# Patient Record
Sex: MALE | Race: BLACK OR AFRICAN AMERICAN | Employment: PART TIME | URBAN - METROPOLITAN AREA
[De-identification: names, ages, dates, MRNs, and addresses within clinical notes are randomized per-mention and may not be internally consistent; named-entity substitution may affect disease eponyms.]

---

## 2024-01-17 ENCOUNTER — OFFICE VISIT (OUTPATIENT)
Dept: FAMILY MEDICINE CLINIC | Age: 19
End: 2024-01-17
Payer: COMMERCIAL

## 2024-01-17 VITALS
HEART RATE: 75 BPM | DIASTOLIC BLOOD PRESSURE: 72 MMHG | SYSTOLIC BLOOD PRESSURE: 118 MMHG | HEIGHT: 66 IN | TEMPERATURE: 98 F | RESPIRATION RATE: 16 BRPM | WEIGHT: 170.4 LBS | OXYGEN SATURATION: 99 % | BODY MASS INDEX: 27.38 KG/M2

## 2024-01-17 DIAGNOSIS — J45.50 SEVERE PERSISTENT ASTHMA WITHOUT COMPLICATION: ICD-10-CM

## 2024-01-17 DIAGNOSIS — L30.9 ECZEMA, UNSPECIFIED TYPE: ICD-10-CM

## 2024-01-17 DIAGNOSIS — J30.89 NON-SEASONAL ALLERGIC RHINITIS, UNSPECIFIED TRIGGER: ICD-10-CM

## 2024-01-17 DIAGNOSIS — Z76.89 ESTABLISHING CARE WITH NEW DOCTOR, ENCOUNTER FOR: Primary | ICD-10-CM

## 2024-01-17 DIAGNOSIS — Z87.438 HISTORY OF TORSION OF TESTIS: ICD-10-CM

## 2024-01-17 PROBLEM — J30.9 ALLERGIC RHINITIS: Status: ACTIVE | Noted: 2024-01-17

## 2024-01-17 PROBLEM — J45.909 ASTHMA: Status: ACTIVE | Noted: 2024-01-17

## 2024-01-17 PROCEDURE — 99204 OFFICE O/P NEW MOD 45 MIN: CPT | Performed by: INTERNAL MEDICINE

## 2024-01-17 RX ORDER — FLUTICASONE PROPIONATE 50 MCG
2 SPRAY, SUSPENSION (ML) NASAL DAILY
COMMUNITY
Start: 2024-01-04 | End: 2024-01-17 | Stop reason: SDUPTHER

## 2024-01-17 RX ORDER — CETIRIZINE HYDROCHLORIDE 10 MG/1
10 TABLET ORAL DAILY
COMMUNITY
Start: 2024-01-04 | End: 2024-01-17 | Stop reason: SDUPTHER

## 2024-01-17 RX ORDER — IPRATROPIUM BROMIDE AND ALBUTEROL 20; 100 UG/1; UG/1
1 SPRAY, METERED RESPIRATORY (INHALATION) EVERY 4 HOURS PRN
COMMUNITY
Start: 2024-01-05 | End: 2024-01-17 | Stop reason: SDUPTHER

## 2024-01-17 RX ORDER — DUPILUMAB 300 MG/2ML
300 INJECTION, SOLUTION SUBCUTANEOUS
Qty: 4 ML | Refills: 5 | Status: CANCELLED | OUTPATIENT
Start: 2024-01-17

## 2024-01-17 RX ORDER — FLUTICASONE PROPIONATE 50 MCG
2 SPRAY, SUSPENSION (ML) NASAL DAILY
Qty: 16 G | Refills: 0 | Status: SHIPPED | OUTPATIENT
Start: 2024-01-17

## 2024-01-17 RX ORDER — DUPILUMAB 300 MG/2ML
300 INJECTION, SOLUTION SUBCUTANEOUS
COMMUNITY

## 2024-01-17 RX ORDER — FLUTICASONE PROPIONATE AND SALMETEROL XINAFOATE 230; 21 UG/1; UG/1
2 AEROSOL, METERED RESPIRATORY (INHALATION) 2 TIMES DAILY
COMMUNITY
End: 2024-01-17 | Stop reason: SDUPTHER

## 2024-01-17 RX ORDER — FLUTICASONE PROPIONATE AND SALMETEROL XINAFOATE 230; 21 UG/1; UG/1
2 AEROSOL, METERED RESPIRATORY (INHALATION) 2 TIMES DAILY
Qty: 12 G | Refills: 5 | Status: SHIPPED | OUTPATIENT
Start: 2024-01-17

## 2024-01-17 RX ORDER — IPRATROPIUM BROMIDE AND ALBUTEROL 20; 100 UG/1; UG/1
1 SPRAY, METERED RESPIRATORY (INHALATION) EVERY 4 HOURS PRN
Qty: 4 G | Refills: 5 | Status: SHIPPED | OUTPATIENT
Start: 2024-01-17

## 2024-01-17 RX ORDER — CETIRIZINE HYDROCHLORIDE 10 MG/1
10 TABLET ORAL DAILY
Qty: 90 TABLET | Refills: 3 | Status: SHIPPED | OUTPATIENT
Start: 2024-01-17

## 2024-01-17 SDOH — ECONOMIC STABILITY: INCOME INSECURITY: HOW HARD IS IT FOR YOU TO PAY FOR THE VERY BASICS LIKE FOOD, HOUSING, MEDICAL CARE, AND HEATING?: NOT HARD AT ALL

## 2024-01-17 SDOH — ECONOMIC STABILITY: FOOD INSECURITY: WITHIN THE PAST 12 MONTHS, THE FOOD YOU BOUGHT JUST DIDN'T LAST AND YOU DIDN'T HAVE MONEY TO GET MORE.: NEVER TRUE

## 2024-01-17 SDOH — ECONOMIC STABILITY: FOOD INSECURITY: WITHIN THE PAST 12 MONTHS, YOU WORRIED THAT YOUR FOOD WOULD RUN OUT BEFORE YOU GOT MONEY TO BUY MORE.: NEVER TRUE

## 2024-01-17 SDOH — ECONOMIC STABILITY: HOUSING INSECURITY
IN THE LAST 12 MONTHS, WAS THERE A TIME WHEN YOU DID NOT HAVE A STEADY PLACE TO SLEEP OR SLEPT IN A SHELTER (INCLUDING NOW)?: NO

## 2024-01-17 ASSESSMENT — PATIENT HEALTH QUESTIONNAIRE - PHQ9
SUM OF ALL RESPONSES TO PHQ9 QUESTIONS 1 & 2: 0
1. LITTLE INTEREST OR PLEASURE IN DOING THINGS: 0
SUM OF ALL RESPONSES TO PHQ QUESTIONS 1-9: 0
2. FEELING DOWN, DEPRESSED OR HOPELESS: 0
SUM OF ALL RESPONSES TO PHQ QUESTIONS 1-9: 0

## 2024-01-17 NOTE — PROGRESS NOTES
[facility] PRIMARY CARE  [address] [facility] [facility] [address]  Dept: [phone]  Dept Fax: [phone]     NAME: Maria Victoria Wray        :  2005        MRN:  [id number]    Chief Complaint   Patient presents with    New Patient     Needing to Establish just moved from [place], was at [facility] in 10/2023 for testicular torsion, has not seen Urologist. Surgery done in [place] at [facility].     Asthma    Eczema       History of Present Illness  Maria Victoria Wray is a 18 y.o. male who presents today to establish care.     Had testicular torsion at age 14 initially, and had emergency surgery and both side were tacked to prevent reoccurrence     Had pain again recently and was seen at [facility] but pain resolved spontaneously     Patient was diagnosed with severe asthma while living in [place].  He is well-controlled on Advair and Combivent as needed.  He is also on Dupixent which was initially prescribed for his asthma although only covered by insurance for the diagnosis of eczema.  He does suffer from eczema as well and flareups are debilitating.  He was following with both pulmonology and dermatology in [place].  Dupixent was coming from his dermatologist.    Review of Systems  Please see HPI above. Comprehensive review of systems negative unless otherwise noted above.    Medical History   No past medical history on file.    Surgical History   No past surgical history on file.    Family History  No family history on file.    Social History  Social History     Tobacco Use    Smoking status: Never     Passive exposure: Past    Smokeless tobacco: Never   Substance Use Topics    Alcohol use: Never       Home Medications  Current Outpatient Medications   Medication Sig Dispense Refill    DUPIXENT 300 MG/2ML SOPN injection Inject 2 mLs into the skin every 14 days      ADVAIR -21 MCG/ACT inhaler Inhale 2 puffs into the lungs 2 times daily 12 g 5    cetirizine

## 2024-03-06 ENCOUNTER — APPOINTMENT (OUTPATIENT)
Dept: GENERAL RADIOLOGY | Age: 19
End: 2024-03-06
Payer: COMMERCIAL

## 2024-03-06 ENCOUNTER — HOSPITAL ENCOUNTER (EMERGENCY)
Age: 19
Discharge: HOME | End: 2024-03-06
Payer: COMMERCIAL

## 2024-03-06 VITALS
OXYGEN SATURATION: 100 % | SYSTOLIC BLOOD PRESSURE: 108 MMHG | TEMPERATURE: 98 F | HEART RATE: 88 BPM | RESPIRATION RATE: 18 BRPM | DIASTOLIC BLOOD PRESSURE: 60 MMHG

## 2024-03-06 DIAGNOSIS — S60.221A CONTUSION OF RIGHT HAND, INITIAL ENCOUNTER: Primary | ICD-10-CM

## 2024-03-06 PROCEDURE — 99283 EMERGENCY DEPT VISIT LOW MDM: CPT

## 2024-03-06 PROCEDURE — 73130 X-RAY EXAM OF HAND: CPT

## 2024-03-06 ASSESSMENT — PAIN - FUNCTIONAL ASSESSMENT: PAIN_FUNCTIONAL_ASSESSMENT: 0-10

## 2024-03-06 ASSESSMENT — PAIN SCALES - GENERAL: PAINLEVEL_OUTOF10: 4

## 2024-03-06 NOTE — DISCHARGE INSTR - COC
Continuity of Care Form    Patient Name: Maria Victoria Wray   :  2005  MRN:  [id number]    Admit date:  3/6/2024  Discharge date:  ***    Code Status Order: No Order   Advance Directives:     Admitting Physician:  No admitting provider for patient encounter.  PCP: Divya Olivier DO    Discharging Nurse: ***  Discharging Hospital Unit/Room#:   Discharging Unit Phone Number: ***    Emergency Contact:   Extended Emergency Contact Information  Primary Emergency Contact: Jelly Hebert, [facility]  Home Phone: [phone]  Mobile Phone: [phone]  Relation: Parent    Past Surgical History:  History reviewed. No pertinent surgical history.    Immunization History:     There is no immunization history on file for this patient.    Active Problems:  Patient Active Problem List   Diagnosis Code    Allergic rhinitis J30.9    Asthma J45.909    Eczema L30.9       Isolation/Infection:   Isolation            No Isolation          Patient Infection Status       None to display            Nurse Assessment:  Last Vital Signs: /60   Pulse 88   Temp 98 °F (36.7 °C)   Resp 18   SpO2 100%     Last documented pain score (0-10 scale): Pain Level: 4  Last Weight:   Wt Readings from Last 1 Encounters:   24 77.3 kg (170 lb 6.4 oz) (74 %, Z= 0.64)*     * Growth percentiles are based on [facility] (Boys, 2-20 Years) data.     Mental Status:  {IP PT MENTAL STATUS:83021}    IV Access:  { ROYAL IV ACCESS:108182264}    Nursing Mobility/ADLs:  Walking   {CHP DME ADLs:118785000}  Transfer  {CHP DME ADLs:741743919}  Bathing  {CHP DME ADLs:794687058}  Dressing  {CHP DME ADLs:782068631}  Toileting  {CHP DME ADLs:809489557}  Feeding  {CHP DME ADLs:829596689}  Med Admin  {CHP DME ADLs:285427113}  Med Delivery   { ROYAL MED Delivery:309101650}    Wound Care Documentation and Therapy:        Elimination:  Continence:   Bowel: {YES / NO:}  Bladder: {YES / NO:}  Urinary Catheter:

## 2024-03-06 NOTE — ED PROVIDER NOTES
---------------------------   The nursing notes within the ED encounter and vital signs as below have been reviewed.   /60   Pulse 88   Temp 98 °F (36.7 °C)   Resp 18   SpO2 100%   Oxygen Saturation Interpretation: Normal      ---------------------------------------------------PHYSICAL EXAM--------------------------------------    Constitutional/General: Alert and oriented x3, well appearing, non toxic in NAD  Head: NC/AT  Eyes: PERRL, EOMI  Mouth: Oropharynx clear, handling secretions, no trismus  Neck: Supple, full ROM, no meningeal signs  Pulmonary: Lungs clear to auscultation bilaterally, no wheezes, rales, or rhonchi. Not in respiratory distress  Cardiovascular:  Regular rate and rhythm, no murmurs, gallops, or rubs. 2+ distal pulses  Abdomen: Soft, non tender, non distended,   Extremities: Moves all extremities x 4. Warm and well perfused.  Full range of motion of all the digits bilaterally.  Patient able to make a fist.  Patient able to touch thumb to pinky.  Sensations intact.  2+ radial pulses.  Good capillary refill  Skin: warm and dry without rash. + Ecchymosis noted over the MCP joints of the right hand that is in the healing phases.  Neurologic: GCS 15,  Psych: Normal Affect      ------------------------------ ED COURSE/MEDICAL DECISION MAKING----------------------  Medications - No data to display      Medical Decision Making:    Maria Victoria Wray is a 18 y.o. male with past medical history of asthma, eczema and allergy presenting to the ED by private vehicle for right hand pain.  He frequently practices boxing and has a punching bag at home.  A week ago he was boxing with his gloves on when he noticed his right hand hurt more than usual.  He did notice some bruising over the ring finger and the pinky finger at the knuckles.  The pain has not subsided over the past week.  It continues to be an annoyance to him.  He has not tried any medications at home for the pain.  Full motor and sensory

## 2024-03-12 ENCOUNTER — TELEPHONE (OUTPATIENT)
Dept: PULMONOLOGY | Age: 19
End: 2024-03-12

## 2024-03-13 ENCOUNTER — OFFICE VISIT (OUTPATIENT)
Dept: PULMONOLOGY | Age: 19
End: 2024-03-13
Payer: COMMERCIAL

## 2024-03-13 VITALS
SYSTOLIC BLOOD PRESSURE: 128 MMHG | WEIGHT: 183 LBS | HEART RATE: 52 BPM | OXYGEN SATURATION: 98 % | DIASTOLIC BLOOD PRESSURE: 60 MMHG | TEMPERATURE: 97.2 F | RESPIRATION RATE: 17 BRPM | BODY MASS INDEX: 29.54 KG/M2

## 2024-03-13 DIAGNOSIS — R06.2 WHEEZING: ICD-10-CM

## 2024-03-13 DIAGNOSIS — J45.20 MILD INTERMITTENT ASTHMA WITHOUT COMPLICATION: Primary | ICD-10-CM

## 2024-03-13 LAB — FENO: 11 PPB

## 2024-03-13 PROCEDURE — 99203 OFFICE O/P NEW LOW 30 MIN: CPT | Performed by: NURSE PRACTITIONER

## 2024-03-13 PROCEDURE — 99205 OFFICE O/P NEW HI 60 MIN: CPT | Performed by: NURSE PRACTITIONER

## 2024-03-13 PROCEDURE — 95012 NITRIC OXIDE EXP GAS DETER: CPT | Performed by: NURSE PRACTITIONER

## 2024-03-13 RX ORDER — ALBUTEROL SULFATE 90 UG/1
2 AEROSOL, METERED RESPIRATORY (INHALATION) EVERY 4 HOURS PRN
Qty: 18 G | Refills: 2 | Status: SHIPPED | OUTPATIENT
Start: 2024-03-13 | End: 2024-04-16

## 2024-03-13 ASSESSMENT — PULMONARY FUNCTION TESTS: FENO: 11

## 2024-03-13 NOTE — PROGRESS NOTES
New pt to establish care with pulmonary provider since moving to [place]. Plan for pt to continue meds as ordered. Discontinue Combivent and use new Albuterol rescue inhaler prn. Plan for lab work and follow up in office in  6 mos. Appt card given.   
17, Temp: 97.2 °F (36.2 °C),  , SpO2: 98 %,  , Weight - Scale: 83 kg (183 lb)    BMI body mass index is 29.54 kg/m².  Ideal Body Weight: Patient height not recorded  ---------------  Physical Exam:    General: Alert and oriented x 3. No acute distress.  Eyes:  Vision - grossly normal, PERRLA  HEENT:  Head is atraumatic and normocephalic.  Neck is supple, no jugular venous distention.  Respiratory:  Lungs are clear to auscultation, respirations are nonlabored, breath sounds are equal.  Cardiovascular:  S1, S2 normal, regular rate and rhythm, no murmur, no pedal edema  Gastrointestinal:  Soft, nontender, nondistended.  Normal bowel sounds.  No organomegaly  Neurologic:  Awake and alert, cranial nerves 2-12 grossly intact, no focal motor or sensory deficits.  Musculoskeletal: Steady ambulation without need for assistance    Labs     CBC with Differential:  No results found for: \"WBC\", \"RBC\", \"HGB\", \"HCT\", \"PLT\", \"MCV\", \"MCH\", \"MCHC\", \"RDW\", \"NRBC\", \"SEGSPCT\", \"BANDSPCT\", \"BLASTSPCT\", \"METASPCT\", \"LYMPHOPCT\", \"PROMYELOPCT\", \"MONOPCT\", \"MYELOPCT\", \"EOSPCT\", \"BASOPCT\", \"MONOSABS\", \"LYMPHSABS\", \"EOSABS\", \"BASOSABS\", \"DIFFTYPE\"  CMP:  No results found for: \"NA\", \"K\", \"CL\", \"CO2\", \"BUN\", \"CREATININE\", \"GFRAA\", \"AGRATIO\", \"LABGLOM\", \"GLUCOSE\", \"GLU\", \"PROT\", \"LABALBU\", \"CALCIUM\", \"BILITOT\", \"ALKPHOS\", \"AST\", \"ALT\"  Magnesium:  No results found for: \"MG\"  Phosphorus:  No results found for: \"PHOS\"  ABG:  No results found for: \"PH\", \"PCO2\", \"PO2\", \"HCO3\", \"BE\", \"THGB\", \"TCO2\", \"O2SAT\"       I hope this updates you on my evaluation and clinical thinking. Thank you for allowing me to participate in the care of Maria Victoria Wray.      Sincerely,    Electronically signed by HUNTER Romo CNP on 3/13/2024 at 11:59 AM

## 2024-03-19 DIAGNOSIS — R06.2 WHEEZING: ICD-10-CM

## 2024-03-19 LAB — EOSINOPHILS ABSOLUTE: 140 /UL (ref 50–250)

## 2024-03-22 LAB
ALLERGEN BERMUDA GRASS IGE: 9.47 KU/L (ref 0–0.34)
ALLERGEN BIRCH IGE: 2.21 KU/L (ref 0–0.34)
ALLERGEN DOG DANDER IGE: 1.47 KU/L (ref 0–0.34)
ALLERGEN GERMAN COCKROACH IGE: 0.15 KU/L (ref 0–0.34)
ALLERGEN HORMODENDRUM IGE: 4.41 KUL/L (ref 0–0.34)
ALLERGEN MOUSE EPITHELIA IGE: 0.12 KU/L (ref 0–0.34)
ALLERGEN OAK TREE IGE: 2.13 KU/L (ref 0–0.34)
ALLERGEN PECAN TREE IGE: 2.76 KU/L (ref 0–0.34)
ALLERGEN PIGWEED ROUGH IGE: ABNORMAL KU/L (ref 0–0.34)
ALLERGEN SHEEP SORREL (W18) IGE: ABNORMAL KU/L (ref 0–0.34)
ALLERGEN TREE SYCAMORE: 2.73 KU/L (ref 0–0.34)
ALLERGEN WALNUT TREE IGE: 3.07 KU/L (ref 0–0.34)
ALLERGEN WHITE MULBERRY TREE, IGE: 1.1 KU/L (ref 0–0.34)
ALLERGEN, TREE, WHITE ASH IGE: 4.95 KU/L (ref 0–0.34)
ALTERNARIA ALTERNATA: 6.06 KU/L (ref 0–0.34)
ASPERGILLUS FUMIGATUS: 4.72 KU/L (ref 0–0.34)
CAT DANDER ANTIBODY: 2.03 KU/L (ref 0–0.34)
COTTONWOOD TREE: 4.22 KU/L (ref 0–0.34)
D. FARINAE: 0.46 KU/L (ref 0–0.34)
D. PTERONYSSINUS: 0.41 KU/L (ref 0–0.34)
ELM TREE: 2.78 KU/L (ref 0–0.34)
IGE: 173 IU/ML (ref 0–100)
MAPLE/BOXELDER TREE: 2.39 KU/L (ref 0–0.34)
MOUNTAIN CEDAR TREE: 0.86 KU/L (ref 0–0.34)
MUCOR RACEMOSUS: 0.56 KU/L (ref 0–0.34)
P. NOTATUM: 1.49 KU/L (ref 0–0.34)
RUSSIAN THISTLE: ABNORMAL KU/L (ref 0–0.34)
SHORT RAGWD(A ARTEMIS.) IGE: ABNORMAL KU/L (ref 0–0.34)
TIMOTHY GRASS: 22.2 KU/L (ref 0–0.34)

## 2024-03-26 ENCOUNTER — OFFICE VISIT (OUTPATIENT)
Dept: FAMILY MEDICINE CLINIC | Age: 19
End: 2024-03-26
Payer: COMMERCIAL

## 2024-03-26 ENCOUNTER — TELEPHONE (OUTPATIENT)
Dept: FAMILY MEDICINE CLINIC | Age: 19
End: 2024-03-26

## 2024-03-26 VITALS
HEART RATE: 55 BPM | BODY MASS INDEX: 28.93 KG/M2 | OXYGEN SATURATION: 99 % | SYSTOLIC BLOOD PRESSURE: 139 MMHG | DIASTOLIC BLOOD PRESSURE: 82 MMHG | WEIGHT: 180 LBS | TEMPERATURE: 97.5 F | HEIGHT: 66 IN | RESPIRATION RATE: 16 BRPM

## 2024-03-26 DIAGNOSIS — M25.561 ACUTE PAIN OF RIGHT KNEE: Primary | ICD-10-CM

## 2024-03-26 PROCEDURE — 99213 OFFICE O/P EST LOW 20 MIN: CPT

## 2024-03-26 RX ORDER — ACETAMINOPHEN 500 MG
500 TABLET ORAL 4 TIMES DAILY PRN
Qty: 30 TABLET | Refills: 1 | Status: CANCELLED | OUTPATIENT
Start: 2024-03-26

## 2024-03-26 NOTE — TELEPHONE ENCOUNTER
----- Message from HUNTER Roca CNP sent at 3/26/2024  4:17 PM EDT -----  Results of XR right knee: No acute osseous abnormality. Suspect mild joint effusion. (Fluid on the knee due to injury). Referral made to [facility] Navigator she will contact patient and get him set up with an ortho doctor who will take testing further. Continue with rest, ice, compression and elevation of the right knee.

## 2024-03-26 NOTE — PROGRESS NOTES
symptoms worsen or change. ED immediately with any calf pain/swelling, severe/worsening knee pain, paresthesias, weakness, fever, CP, or SOB. Pt states understanding and is in agreement with this care plan. All questions answered.      HUNTER Roca - CNP    **This report was transcribed using voice recognition software. Every effort was made to ensure accuracy; however, inadvertent computerized transcription errors may be present.

## 2024-03-26 NOTE — TELEPHONE ENCOUNTER
Results relayed to patient by phone. All questions/concerns addressed and answered. Patient voiced understanding and will call with any additional questions.

## 2024-03-28 LAB
ALLERGEN PIGWEED ROUGH IGE: 3.25 KU/L (ref 0–0.34)
ALLERGEN SHEEP SORREL (W18) IGE: 2.84 KU/L (ref 0–0.34)
RUSSIAN THISTLE: 2.22 KU/L (ref 0–0.34)
SHORT RAGWD(A ARTEMIS.) IGE: 4.21 KU/L (ref 0–0.34)

## 2024-04-02 SDOH — HEALTH STABILITY: PHYSICAL HEALTH: ON AVERAGE, HOW MANY MINUTES DO YOU ENGAGE IN EXERCISE AT THIS LEVEL?: 120 MIN

## 2024-04-02 SDOH — HEALTH STABILITY: PHYSICAL HEALTH: ON AVERAGE, HOW MANY DAYS PER WEEK DO YOU ENGAGE IN MODERATE TO STRENUOUS EXERCISE (LIKE A BRISK WALK)?: 7 DAYS

## 2024-04-04 ENCOUNTER — OFFICE VISIT (OUTPATIENT)
Dept: ORTHOPEDIC SURGERY | Age: 19
End: 2024-04-04
Payer: COMMERCIAL

## 2024-04-04 VITALS — HEIGHT: 66 IN | TEMPERATURE: 98 F | WEIGHT: 180 LBS | BODY MASS INDEX: 28.93 KG/M2

## 2024-04-04 DIAGNOSIS — M25.461 EFFUSION OF RIGHT KNEE: ICD-10-CM

## 2024-04-04 DIAGNOSIS — S83.281A ACUTE LATERAL MENISCUS TEAR OF RIGHT KNEE, INITIAL ENCOUNTER: Primary | ICD-10-CM

## 2024-04-04 DIAGNOSIS — M25.561 ACUTE PAIN OF RIGHT KNEE: ICD-10-CM

## 2024-04-04 PROCEDURE — 99204 OFFICE O/P NEW MOD 45 MIN: CPT | Performed by: ORTHOPAEDIC SURGERY

## 2024-04-04 RX ORDER — MELOXICAM 7.5 MG/1
7.5 TABLET ORAL DAILY
Qty: 30 TABLET | Refills: 3 | Status: SHIPPED | OUTPATIENT
Start: 2024-04-04

## 2024-04-04 ASSESSMENT — ENCOUNTER SYMPTOMS
EYE DISCHARGE: 0
ALLERGIC/IMMUNOLOGIC NEGATIVE: 1
SHORTNESS OF BREATH: 0
ABDOMINAL DISTENTION: 0

## 2024-04-04 NOTE — PROGRESS NOTES
Maria Victoria Wray (:  2005) is a 18 y.o. male,New patient, here for evaluation of the following chief complaint(s):  Knee Pain (Right knee pain - pt was lifting weight x2 weeks ago - explosive exorcises, started noticing pain and tightness while walking down stairs, pain increased at night/after shower )         ASSESSMENT/PLAN:  1. Acute lateral meniscus tear of right knee, initial encounter  -     MRI KNEE RIGHT WO CONTRAST; Future  2. Acute pain of right knee  -     MRI KNEE RIGHT WO CONTRAST; Future  3. Effusion of right knee  -     MRI KNEE RIGHT WO CONTRAST; Future    This is a 18 y.o. year old male with Acute lateral meniscus tear of right knee, initial encounter [S83.698A].  I discussed a variety of treatment options with the patient today including observation, NSAID, low impact exercise, physical therapy. I also discussed MRI in the setting of +McMurrays and acute pain. The patient would like to proceed with NSAIDs and MRI.    We discussed the use of NSAIDs for treatment of effusion including the risk and benefits, possible side affects and need for monitoring of blood work.   Mobic 7.5 BID      Return for MRI review when completed.         Subjective   SUBJECTIVE/OBJECTIVE:  Knee Pain     18-year-old male presents to the office today to discuss his right knee.  He was previously seen at the walk-in clinic for evaluation of the same knee.  He states he was squatting when he noted some issues with his knee.  He felt as though he strained the knee.  He then wrestled the following day and had a twisting injury.  He felt a pop mainly on the lateral side of the knee.  He had difficulty bearing weight.  He noted immediate effusion.  He is wearing a brace on the knee.  He rates his pain 5 out of 10 and is here today to discuss further treatment options.  He states he has not been able to do essentially any activity over the last week secondary to significant pain and difficulty bearing

## 2024-04-19 DIAGNOSIS — J45.50 SEVERE PERSISTENT ASTHMA WITHOUT COMPLICATION: ICD-10-CM

## 2024-04-19 RX ORDER — IPRATROPIUM BROMIDE AND ALBUTEROL 20; 100 UG/1; UG/1
1 SPRAY, METERED RESPIRATORY (INHALATION) EVERY 6 HOURS PRN
Qty: 2 EACH | Refills: 5 | Status: CANCELLED | OUTPATIENT
Start: 2024-04-19

## 2024-04-19 RX ORDER — IPRATROPIUM BROMIDE AND ALBUTEROL 20; 100 UG/1; UG/1
1 SPRAY, METERED RESPIRATORY (INHALATION) EVERY 6 HOURS PRN
Qty: 4 G | Refills: 5 | Status: SHIPPED | OUTPATIENT
Start: 2024-04-19

## 2024-04-19 RX ORDER — IPRATROPIUM BROMIDE AND ALBUTEROL 20; 100 UG/1; UG/1
1 SPRAY, METERED RESPIRATORY (INHALATION) EVERY 4 HOURS PRN
Qty: 4 G | Refills: 5 | Status: SHIPPED
Start: 2024-04-19 | End: 2024-04-19 | Stop reason: SDUPTHER

## 2024-04-19 NOTE — TELEPHONE ENCOUNTER
Last Appointment:  1/17/2024  Future Appointments   Date Time Provider Department Center   4/24/2024 11:30 AM [facility] MRI [facility]   7/17/2024  9:15 AM Divya Olivier DO [facility]   9/17/2024 11:00 AM Rachael Monreal, APRN - CNP [facility]

## 2024-05-02 ENCOUNTER — OFFICE VISIT (OUTPATIENT)
Dept: ORTHOPEDIC SURGERY | Age: 19
End: 2024-05-02
Payer: COMMERCIAL

## 2024-05-02 VITALS — WEIGHT: 180 LBS | HEIGHT: 66 IN | BODY MASS INDEX: 28.93 KG/M2

## 2024-05-02 DIAGNOSIS — M25.561 ACUTE PAIN OF RIGHT KNEE: ICD-10-CM

## 2024-05-02 DIAGNOSIS — S83.281A ACUTE LATERAL MENISCUS TEAR OF RIGHT KNEE, INITIAL ENCOUNTER: Primary | ICD-10-CM

## 2024-05-02 DIAGNOSIS — M25.461 EFFUSION OF RIGHT KNEE: ICD-10-CM

## 2024-05-02 PROCEDURE — 99214 OFFICE O/P EST MOD 30 MIN: CPT | Performed by: ORTHOPAEDIC SURGERY

## 2024-05-02 ASSESSMENT — ENCOUNTER SYMPTOMS
ABDOMINAL DISTENTION: 0
SHORTNESS OF BREATH: 0
ALLERGIC/IMMUNOLOGIC NEGATIVE: 1
EYE DISCHARGE: 0

## 2024-05-02 NOTE — PROGRESS NOTES
Maria Victoria Wray (:  2005) is a 18 y.o. male,New patient, here for evaluation of the following chief complaint(s):  Knee Pain (MRI follow up )      Assessment & Plan   ASSESSMENT/PLAN:  1. Acute lateral meniscus tear of right knee, initial encounter  -     Rashawn Adhikari MD, Orthopaedics, [facility]  2. Acute pain of right knee  -     Rashawn Adhikari MD, Orthopaedics, [facility]  3. Effusion of right knee  -     Rashawn Adhikari MD, Orthopaedics, [facility]    This is a 18 y.o. year old male with Acute lateral meniscus tear of right knee, initial encounter [S83.022A].  I discussed a variety of treatment options with the patient today including observation, NSAID, low impact exercise, physical therapy. I also discussed possibility of repair vs partial meniscectomy.  Discussed Referral to Dr. Hare.  Patient is amenable.    Referral placed.      Return if symptoms worsen or fail to improve.         Subjective   SUBJECTIVE/OBJECTIVE:  Knee Pain     18-year-old male presents to the office today to discuss his right knee.  He was previously seen and MRI was ordered.  I will discuss the results below.  He states he was squatting when he noted some issues with his knee.  He felt as though he strained the knee.  He then wrestled the following day and had a twisting injury.  He felt a pop mainly on the lateral side of the knee.  He had difficulty bearing weight.  He noted immediate effusion.  He rates his pain 5 out of 10 and is here today to discuss further treatment options.  His activity continues to be very limited due to the knee.    History reviewed. No pertinent past medical history.  History reviewed. No pertinent surgical history.   History reviewed. No pertinent family history.   Social History     Tobacco Use    Smoking status: Never     Passive exposure: Past    Smokeless tobacco: Never   Vaping Use    Vaping Use: Never used

## 2024-05-12 SDOH — HEALTH STABILITY: PHYSICAL HEALTH: ON AVERAGE, HOW MANY DAYS PER WEEK DO YOU ENGAGE IN MODERATE TO STRENUOUS EXERCISE (LIKE A BRISK WALK)?: 6 DAYS

## 2024-05-12 SDOH — HEALTH STABILITY: PHYSICAL HEALTH: ON AVERAGE, HOW MANY MINUTES DO YOU ENGAGE IN EXERCISE AT THIS LEVEL?: 90 MIN

## 2024-05-15 ENCOUNTER — OFFICE VISIT (OUTPATIENT)
Dept: ORTHOPEDIC SURGERY | Age: 19
End: 2024-05-15
Payer: COMMERCIAL

## 2024-05-15 ENCOUNTER — TELEPHONE (OUTPATIENT)
Dept: ORTHOPEDIC SURGERY | Age: 19
End: 2024-05-15

## 2024-05-15 VITALS — WEIGHT: 175 LBS | BODY MASS INDEX: 27.47 KG/M2 | HEIGHT: 67 IN

## 2024-05-15 DIAGNOSIS — S83.281A ACUTE LATERAL MENISCUS TEAR OF RIGHT KNEE, INITIAL ENCOUNTER: ICD-10-CM

## 2024-05-15 DIAGNOSIS — Z98.890 STATUS POST ARTHROSCOPY OF RIGHT KNEE: ICD-10-CM

## 2024-05-15 DIAGNOSIS — M25.561 ACUTE PAIN OF RIGHT KNEE: Primary | ICD-10-CM

## 2024-05-15 PROCEDURE — 99214 OFFICE O/P EST MOD 30 MIN: CPT | Performed by: ORTHOPAEDIC SURGERY

## 2024-05-15 NOTE — PROGRESS NOTES
[facility]   ORTHOPAEDIC SURGERY AND SPORTS MEDICINE  DATE OF VISIT:   05/15/24  New Knee Patient     Referring Provider:   Carlo Fernandez MD  [address] [place] [address]    CHIEF COMPLAINT:   Chief Complaint   Patient presents with    Knee Pain     Right knee pain - pt was lifting weight about x2 months ago (around 03/23/24) - explosive exorcises, started noticing pain and tightness while walking down stairs, pain increased at night/after shower.     Pt does try to elevate and ice when home and resting        HPI:      Maria Victoria Wray is a 18 y.o. year old male who is seen today  for evaluation of right knee pain.  He reports the pain has been ongoing for the past 2 months.  He does recall a specific injury which started the pain.  He noticed it when he was doing some workout exercises which were explosive in nature.  He had pain in the lateral side of the knee and reports he may have had some swelling.  He was seen by Dr. Kane Fernandez and worked up including MRI showing probable radial tear of the lateral meniscus.  He was sent here for further treatment.      PAST MEDICAL HISTORY  No past medical history on file.    PAST SURGICAL HISTORY  No past surgical history on file.      FAMILY HISTORY   No family history on file.    SOCIAL HISTORY  Social History     Socioeconomic History    Marital status: Single     Spouse name: Not on file    Number of children: Not on file    Years of education: Not on file    Highest education level: Not on file   Occupational History    Not on file   Tobacco Use    Smoking status: Never     Passive exposure: Past    Smokeless tobacco: Never   Vaping Use    Vaping Use: Never used   Substance and Sexual Activity    Alcohol use: Never    Drug use: Never    Sexual activity: Not on file   Other Topics Concern    Not on file   Social History Narrative    Not on file     Social Determinants of Health     Financial Resource Strain: Low Risk  (1/17/2024)

## 2024-05-15 NOTE — TELEPHONE ENCOUNTER
[facility]  ORTHOPAEDIC SURGERY SCHEDULING NOTE    Patient wishes to proceed after surgery discussion with Dr. Hare.     Surgical education was discussed and patient was given the surgical handout. Pre/post-op appointments were made as needed. Patient is also aware to obtain any medical clearances prior to surgery, if requested. Notified that pre-admission testing will also be reaching out for education and instructions on arrival time prior to procedure.     Patient is to obtain clearance(s) from: n/a    Surgery: Right knee arthroscopy, lateral meniscus repair vs meniscectomy   OR DATE: 5/23/2024  Vendor: [employer]  Block:   CPT: 66708 + 23315  DX: M25.561 + M25.461   Special Needs (if applicable): n/a

## 2024-05-17 RX ORDER — HYDROCODONE BITARTRATE AND ACETAMINOPHEN 5; 325 MG/1; MG/1
1 TABLET ORAL EVERY 6 HOURS PRN
Qty: 28 TABLET | Refills: 0 | Status: SHIPPED | OUTPATIENT
Start: 2024-05-22 | End: 2024-05-29

## 2024-05-17 NOTE — TELEPHONE ENCOUNTER
No auth needed per Santiago from [facility] for codes 32112 vs 97755. Reference # [id number].

## 2024-05-24 ENCOUNTER — OFFICE VISIT (OUTPATIENT)
Dept: ORTHOPEDIC SURGERY | Age: 19
End: 2024-05-24

## 2024-05-24 VITALS — WEIGHT: 175 LBS | BODY MASS INDEX: 27.47 KG/M2 | HEIGHT: 67 IN

## 2024-05-24 DIAGNOSIS — Z98.890 STATUS POST ARTHROSCOPY OF RIGHT KNEE: Primary | ICD-10-CM

## 2024-05-24 PROCEDURE — 99024 POSTOP FOLLOW-UP VISIT: CPT | Performed by: ORTHOPAEDIC SURGERY

## 2024-05-30 ENCOUNTER — OFFICE VISIT (OUTPATIENT)
Dept: ORTHOPEDIC SURGERY | Age: 19
End: 2024-05-30

## 2024-05-30 VITALS — WEIGHT: 175 LBS | HEIGHT: 67 IN | BODY MASS INDEX: 27.47 KG/M2

## 2024-05-30 DIAGNOSIS — Z48.02 VISIT FOR SUTURE REMOVAL: ICD-10-CM

## 2024-05-30 DIAGNOSIS — Z98.890 STATUS POST ARTHROSCOPY OF RIGHT KNEE: Primary | ICD-10-CM

## 2024-05-30 PROCEDURE — 99024 POSTOP FOLLOW-UP VISIT: CPT | Performed by: NURSE PRACTITIONER

## 2024-05-30 NOTE — PROGRESS NOTES
[facility]  ORTHOPAEDICS AND SPORTS MEDICINE  DATE OF VISIT: 05/31/24  Follow Up Post Operative Visit     Follow Up Post Operative Visit     CHIEF COMPLAINT:   Chief Complaint   Patient presents with    Post-Op Check     Pt is here POD # 7 for a right knee arthroscopy,lateral meniscectomy. Overall doing well.        Surgery: Right knee arthroscopy, lateral meniscectomy   Date: 05/23/24     Subjective:    Maria Victoria Wray is here for follow up visit s/p above procedure.  He is doing well.  He has been compliant with postoperative care.  Has no chief complaints today.    Controlled Substances Monitoring:        Physical Exam:    Height: 1.689 m (5' 6.5\"), Weight - Scale: 79.4 kg (175 lb)    General: Alert and oriented x3, no acute distress  Cardiovascular/pulmonary: No labored breathing, peripheral perfusion intact  Musculoskeletal:    Right knee exam displays stable postoperative swelling, neurovascular sensation is grossly intact, incision sites are closed edges are well approximated no drainage present.  No signs or symptoms of infection.        Imaging: Reviewed    Assessment and Plan: Status post right knee arthroscopy, lateral meniscectomy      Patient is postop week 1 from procedure listed above doing well.  Surgical sutures were removed, and new Steri-Strip dressings were applied.  Patient instructed to remove Steri-Strips in 1 week.  Patient is okay to shower for basic hygiene purposes, will not submerge incision sites until mature scars are present.  Continue with basic knee exercises.  Can begin resuming normal activities as tolerated.  Follow-up in 6 weeks.      HUNTER Machado-CNP  Orthopedic Surgery   05/31/24  11:39 AM

## 2024-06-20 RX ORDER — ALBUTEROL SULFATE 90 UG/1
AEROSOL, METERED RESPIRATORY (INHALATION)
Qty: 18 G | Refills: 6 | Status: SHIPPED | OUTPATIENT
Start: 2024-06-20

## 2024-06-22 ENCOUNTER — HOSPITAL ENCOUNTER (EMERGENCY)
Age: 19
Discharge: HOME | End: 2024-06-22
Payer: COMMERCIAL

## 2024-06-22 VITALS
HEART RATE: 62 BPM | RESPIRATION RATE: 16 BRPM | SYSTOLIC BLOOD PRESSURE: 114 MMHG | OXYGEN SATURATION: 98 % | TEMPERATURE: 97.8 F | DIASTOLIC BLOOD PRESSURE: 97 MMHG

## 2024-06-22 DIAGNOSIS — S61.211A LACERATION OF LEFT INDEX FINGER WITHOUT FOREIGN BODY WITHOUT DAMAGE TO NAIL, INITIAL ENCOUNTER: Primary | ICD-10-CM

## 2024-06-22 PROCEDURE — 90471 IMMUNIZATION ADMIN: CPT | Performed by: PHYSICIAN ASSISTANT

## 2024-06-22 PROCEDURE — 12002 RPR S/N/AX/GEN/TRNK2.6-7.5CM: CPT

## 2024-06-22 PROCEDURE — 99284 EMERGENCY DEPT VISIT MOD MDM: CPT

## 2024-06-22 PROCEDURE — 90714 TD VACC NO PRESV 7 YRS+ IM: CPT | Performed by: PHYSICIAN ASSISTANT

## 2024-06-22 PROCEDURE — 6360000002 HC RX W HCPCS: Performed by: PHYSICIAN ASSISTANT

## 2024-06-22 RX ORDER — LIDOCAINE HYDROCHLORIDE 10 MG/ML
10 INJECTION, SOLUTION INFILTRATION; PERINEURAL ONCE
Status: DISCONTINUED | OUTPATIENT
Start: 2024-06-22 | End: 2024-06-22 | Stop reason: HOSPADM

## 2024-06-22 RX ADMIN — CLOSTRIDIUM TETANI TOXOID ANTIGEN (FORMALDEHYDE INACTIVATED) AND CORYNEBACTERIUM DIPHTHERIAE TOXOID ANTIGEN (FORMALDEHYDE INACTIVATED) 0.5 ML: 5; 2 INJECTION, SUSPENSION INTRAMUSCULAR at 14:55

## 2024-06-22 ASSESSMENT — ENCOUNTER SYMPTOMS
CHEST TIGHTNESS: 0
COUGH: 0
SHORTNESS OF BREATH: 0
COLOR CHANGE: 0

## 2024-06-22 NOTE — ED PROVIDER NOTES
Independent [facility] Visit.        Department of Emergency Medicine   ED  Provider Note  Admit Date/RoomTime: 6/22/2024  1:30 PM  ED Room: 31/31  HPI:  6/22/24, Time: 2:52 PM EDT      The patient is a 18-year-old male presenting to the emergency department for laceration to his left index finger.  Patient states that he cut it on a can that he was opening at work.  Patient is unsure of his last tetanus.  He does not take any blood thinners.  He denies any numbness or tingling in the finger.    The history is provided by the patient. No  was used.           REVIEW OF SYSTEMS:  Review of Systems   Constitutional:  Negative for activity change, chills, fatigue and fever.   Respiratory:  Negative for cough, chest tightness and shortness of breath.    Cardiovascular:  Negative for chest pain, palpitations and leg swelling.   Musculoskeletal:  Negative for arthralgias, myalgias, neck pain and neck stiffness.   Skin:  Positive for wound. Negative for color change, pallor and rash.   Neurological:  Negative for dizziness, weakness, light-headedness and headaches.   Hematological:  Does not bruise/bleed easily.   Psychiatric/Behavioral:  Negative for agitation, behavioral problems and confusion.       Pertinent positives and negatives are stated within HPI, all other systems reviewed and are negative.      --------------------------------------------- PAST HISTORY ---------------------------------------------  Past Medical History:  has no past medical history on file.    Past Surgical History:  has no past surgical history on file.    Social History:  reports that he has never smoked. He has been exposed to tobacco smoke. He has never used smokeless tobacco. He reports that he does not drink alcohol and does not use drugs.    Family History: family history is not on file.     The patient’s home medications have been reviewed.    Allergies: [place] (diagnostic), Aspirin, [place] nut (berthollefia excelsa), Dust

## 2024-06-24 ENCOUNTER — HOSPITAL ENCOUNTER (EMERGENCY)
Age: 19
Discharge: HOME | End: 2024-06-24
Payer: COMMERCIAL

## 2024-06-24 VITALS
WEIGHT: 175 LBS | HEART RATE: 62 BPM | TEMPERATURE: 97.9 F | HEIGHT: 66 IN | RESPIRATION RATE: 18 BRPM | BODY MASS INDEX: 28.12 KG/M2 | OXYGEN SATURATION: 98 % | SYSTOLIC BLOOD PRESSURE: 139 MMHG | DIASTOLIC BLOOD PRESSURE: 79 MMHG

## 2024-06-24 DIAGNOSIS — M79.645 FINGER PAIN, LEFT: ICD-10-CM

## 2024-06-24 DIAGNOSIS — T14.8XXD ENCOUNTER FOR RE-CHECK OF LACERATION WOUND: Primary | ICD-10-CM

## 2024-06-24 PROCEDURE — 99283 EMERGENCY DEPT VISIT LOW MDM: CPT

## 2024-06-24 PROCEDURE — 12001 RPR S/N/AX/GEN/TRNK 2.5CM/<: CPT

## 2024-06-24 PROCEDURE — 6370000000 HC RX 637 (ALT 250 FOR IP)

## 2024-06-24 RX ORDER — HYDROCODONE BITARTRATE AND ACETAMINOPHEN 5; 325 MG/1; MG/1
1 TABLET ORAL ONCE
Status: COMPLETED | OUTPATIENT
Start: 2024-06-24 | End: 2024-06-24

## 2024-06-24 RX ORDER — CEPHALEXIN 500 MG/1
500 CAPSULE ORAL 4 TIMES DAILY
Qty: 28 CAPSULE | Refills: 0 | Status: SHIPPED | OUTPATIENT
Start: 2024-06-24 | End: 2024-07-01

## 2024-06-24 RX ORDER — CEPHALEXIN 250 MG/1
500 CAPSULE ORAL ONCE
Status: COMPLETED | OUTPATIENT
Start: 2024-06-24 | End: 2024-06-24

## 2024-06-24 RX ORDER — TRAMADOL HYDROCHLORIDE 50 MG/1
50 TABLET ORAL EVERY 8 HOURS PRN
Qty: 9 TABLET | Refills: 0 | Status: SHIPPED | OUTPATIENT
Start: 2024-06-24 | End: 2024-06-27

## 2024-06-24 RX ADMIN — Medication: at 21:08

## 2024-06-24 RX ADMIN — CEPHALEXIN 500 MG: 250 CAPSULE ORAL at 21:04

## 2024-06-24 RX ADMIN — HYDROCODONE BITARTRATE AND ACETAMINOPHEN 1 TABLET: 5; 325 TABLET ORAL at 21:05

## 2024-06-24 ASSESSMENT — PAIN - FUNCTIONAL ASSESSMENT: PAIN_FUNCTIONAL_ASSESSMENT: NONE - DENIES PAIN

## 2024-06-24 NOTE — ED PROVIDER NOTES
Independent [facility] Visit      [facility]  Department of Emergency Medicine   ED  Encounter Note  Admit Date/RoomTime: 2024  8:43 PM  ED Room: GUILLE/GUILLE    NAME: Maria Victoria Wray  : 2005  MRN: [id number]     Chief Complaint:  Wound Check (Left hand second finger wound check, workers comp. Had stitches placed two days ago and still bleeding. )    History of Present Illness   History provided by the patient and chart review.     Maria Victoria Wray is a 18 y.o. old male who presents to the emergency department by private vehicle, for evaluation of ongoing issues with small amounts of bleeding from a laceration located on the palmar surface of his left index finger distal phalanx, which occured 3 day(s) prior to arrival at work.  He reports that he cut it on a can that he was trying to open.  The wound was closed with sutures 3 days ago. The wound is / has clean, dry, and nontender .  Skin flap is pale and he reports increased pain at the site. Tetanus Status:  up to date as of 2024.     ROS   Pertinent positives and negatives are stated within HPI, all other systems reviewed and are negative.    Past Medical History:  has a past medical history of Asthma.    Surgical History:  has no past surgical history on file.    Social History:  reports that he has never smoked. He has been exposed to tobacco smoke. He has never used smokeless tobacco. He reports that he does not drink alcohol and does not use drugs.    Family History: family history is not on file.     Allergies: [place] (diagnostic), Aspirin, Brazil nut (berthollefia excelsa), Dust mite extract, and Other    Physical Exam   Oxygen Saturation Interpretation: Normal.        ED Triage Vitals   BP Temp Temp Source Pulse Respirations SpO2 Height Weight - Scale   24   139/79 97.9 °F (36.6 °C) Temporal 66 18 99 % 1.676 m (5' 6\") 79.4 kg

## 2024-07-01 ENCOUNTER — OFFICE VISIT (OUTPATIENT)
Dept: ORTHOPEDIC SURGERY | Age: 19
End: 2024-07-01
Payer: COMMERCIAL

## 2024-07-01 VITALS — WEIGHT: 175 LBS | BODY MASS INDEX: 28.12 KG/M2 | HEIGHT: 66 IN

## 2024-07-01 DIAGNOSIS — Z98.890 STATUS POST ARTHROSCOPY OF RIGHT KNEE: Primary | ICD-10-CM

## 2024-07-01 PROCEDURE — 99213 OFFICE O/P EST LOW 20 MIN: CPT | Performed by: ORTHOPAEDIC SURGERY

## 2024-07-01 NOTE — PROGRESS NOTES
[facility]   ORTHOPAEDIC SURGERY AND SPORTS MEDICINE  DATE OF VISIT: 07/01/24  Follow Up Post Operative Visit     CHIEF COMPLAINT:   Chief Complaint   Patient presents with    Post-Op Check     Pt is here today almost 6 weeks out from a right knee arthroscopy, lateral meniscectomy. Overall doing well.        Surgery: Right knee arthroscopy, lateral meniscectomy   Date: 05/23/24     Subjective:    Maria Victoria Wray is here for follow up visit s/p above procedure.  He is doing well.  He has been back to work, he has some occasional pain in the knee but is doing okay    Controlled Substances Monitoring:        Physical Exam:    Height: 1.676 m (5' 6\"), Weight - Scale: 79.4 kg (175 lb)    General: Alert and oriented x3, no acute distress  Cardiovascular/pulmonary: No labored breathing, peripheral perfusion intact  Musculoskeletal:    Exam of the knee today shows healed incisions.  There is no effusion.  There is full range of motion.  Ligamentous exam is stable.  There is no joint line tenderness      Imaging: No new imaging    Assessment and Plan: 6 weeks out from right knee arthroscopy, partial lateral meniscectomy  He is doing well.  He can continue to progress with activities as tolerated including lifting and running.  We will see him back as needed    Miguelito Hare MD  Orthopaedic Surgery   7/1/24  11:29 AM

## 2024-08-26 DIAGNOSIS — J30.89 NON-SEASONAL ALLERGIC RHINITIS, UNSPECIFIED TRIGGER: ICD-10-CM

## 2024-08-26 RX ORDER — FLUTICASONE PROPIONATE 50 MCG
2 SPRAY, SUSPENSION (ML) NASAL DAILY
Qty: 16 G | Refills: 0 | Status: SHIPPED | OUTPATIENT
Start: 2024-08-26

## 2024-08-26 NOTE — TELEPHONE ENCOUNTER
Last Appointment:  1/17/2024  Future Appointments   Date Time Provider Department Center   9/17/2024 11:00 AM Rachael Monreal, APRN - CNP [facility]

## 2024-08-29 ENCOUNTER — OFFICE VISIT (OUTPATIENT)
Dept: FAMILY MEDICINE CLINIC | Age: 19
End: 2024-08-29
Payer: COMMERCIAL

## 2024-08-29 VITALS
OXYGEN SATURATION: 98 % | DIASTOLIC BLOOD PRESSURE: 60 MMHG | TEMPERATURE: 98.3 F | BODY MASS INDEX: 28.51 KG/M2 | HEIGHT: 66 IN | HEART RATE: 62 BPM | WEIGHT: 177.4 LBS | RESPIRATION RATE: 16 BRPM | SYSTOLIC BLOOD PRESSURE: 110 MMHG

## 2024-08-29 DIAGNOSIS — H66.91 RIGHT OTITIS MEDIA, UNSPECIFIED OTITIS MEDIA TYPE: Primary | ICD-10-CM

## 2024-08-29 DIAGNOSIS — H69.93 EUSTACHIAN TUBE DYSFUNCTION, BILATERAL: ICD-10-CM

## 2024-08-29 PROCEDURE — 99203 OFFICE O/P NEW LOW 30 MIN: CPT

## 2024-08-29 RX ORDER — PREDNISONE 10 MG/1
TABLET ORAL
Qty: 18 TABLET | Refills: 0 | Status: SHIPPED | OUTPATIENT
Start: 2024-08-29 | End: 2024-09-06

## 2024-08-29 RX ORDER — CEFDINIR 300 MG/1
300 CAPSULE ORAL 2 TIMES DAILY
Qty: 20 CAPSULE | Refills: 0 | Status: SHIPPED | OUTPATIENT
Start: 2024-08-29 | End: 2024-09-08

## 2024-08-29 NOTE — PROGRESS NOTES
Chief Complaint   Dizziness (Patients states he is dizzy when he moves his head left to right) and Sinus Problem (Congestion and pressure around head)    History of Present Illness   Source of history provided by:  patient.      Maria Victoria Wray is a 18 y.o. old male presenting to the walk in clinic for evaluation of sensation of room spinning when moving head left to right.  He reports associated pressure in bilateral ears and his typical sinus headache.  He reports he does have history of seasonal allergies but has not had much nasal congestion and drainage.  History of sinus issues in the past.  Denies any cough or chest congestion.  Since recognized, the symptoms have been  intermittent .  It is  positional. Denies any visual changes. Pt denies any recent falls, syncope, CP, SOB, palpitations, HA, visual loss, unilateral weakness, fever, neck stiffness, or recent illness.      ROS    Unless otherwise stated in this report or unable to obtain because of the patient's clinical or mental status as evidenced by the medical record, this patients's positive and negative responses for Review of Systems, constitutional, psych, eyes, ENT, cardiovascular, respiratory, gastrointestinal, neurological, genitourinary, musculoskeletal, integument systems and systems related to the presenting problem are either stated in the preceding or were not pertinent or were negative for the symptoms and/or complaints related to the medical problem.    Physical Exam         VS:  /60   Pulse 62   Temp 98.3 °F (36.8 °C) (Temporal)   Resp 16   Ht 1.676 m (5' 6\")   Wt 80.5 kg (177 lb 6.4 oz)   SpO2 98%   BMI 28.63 kg/m²    Oxygen Saturation Interpretation: Normal.    Constitutional:  Alert, development consistent with age.  Ears:  External Ears: Bilateral pinna normal.  Right tympanic membrane with serous effusion and air-fluid levels seen, moderate erythema and bulging noted.  Left tympanic membrane with serous effusion, no  erythema present.  Canals normal bilaterally without swelling or exudate  Nose: Mild congestion of the nasal mucosa. There is injection to middle turbinates bilaterally.   Throat: Mild posterior pharyngeal erythema with mild post nasal drip present.  No exudate or tonsillar hypertrophy noted.    Neck:  Supple. There is no anterior cervical adenopathy.  Lungs: CTAB without wheezes, rales, or rhonchi  Heart:  Regular rate and rhythm, normal heart sounds, without pathological murmurs, ectopy, gallops, or rubs.  Skin:  Normal turgor.  Warm, dry, without visible rash.  Neurological:  Oriented.  Motor functions intact.  CN II-XII intact. No nystagmus noted.  Ambulates without ataxia.  UE/LE/ strength 5/5 bilaterally.    Lab / Imaging Results   (All laboratory and radiology results have been personally reviewed by myself)  Labs:  No results found for this visit on 08/29/24.    Imaging:  All Radiology results interpreted by Radiologist unless otherwise noted.      Assessment / Plan     Impression(s):  Maria Victoria was seen today for dizziness and sinus problem.    Diagnoses and all orders for this visit:    Right otitis media, unspecified otitis media type  -     cefdinir (OMNICEF) 300 MG capsule; Take 1 capsule by mouth 2 times daily for 10 days  -     predniSONE (DELTASONE) 10 MG tablet; Take 3 tablets by mouth daily for 3 days, THEN 2 tablets daily for 3 days, THEN 1 tablet daily for 3 days.    Eustachian tube dysfunction, bilateral  -     predniSONE (DELTASONE) 10 MG tablet; Take 3 tablets by mouth daily for 3 days, THEN 2 tablets daily for 3 days, THEN 1 tablet daily for 3 days.      Disposition:  Disposition: Discharge to home.    Vital signs, past medical history, medications, and allergies reviewed at visit.    Differential diagnosis discussed with patient in detail including sinus/otitis etiology for symptoms versus vertigo.  Due to findings on physical exam I will treat for otitis media and have him have close

## 2024-09-13 DIAGNOSIS — J45.50 SEVERE PERSISTENT ASTHMA WITHOUT COMPLICATION: ICD-10-CM

## 2024-09-13 RX ORDER — FLUTICASONE PROPIONATE AND SALMETEROL XINAFOATE 230; 21 UG/1; UG/1
2 AEROSOL, METERED RESPIRATORY (INHALATION) 2 TIMES DAILY
Qty: 12 G | Refills: 5 | Status: SHIPPED | OUTPATIENT
Start: 2024-09-13

## 2024-09-16 DIAGNOSIS — J45.50 SEVERE PERSISTENT ASTHMA WITHOUT COMPLICATION: ICD-10-CM

## 2024-09-16 RX ORDER — FLUTICASONE PROPIONATE AND SALMETEROL XINAFOATE 230; 21 UG/1; UG/1
2 AEROSOL, METERED RESPIRATORY (INHALATION) 2 TIMES DAILY
Qty: 12 G | Refills: 5 | Status: SHIPPED | OUTPATIENT
Start: 2024-09-16

## 2024-10-01 ENCOUNTER — TELEPHONE (OUTPATIENT)
Dept: PULMONOLOGY | Age: 19
End: 2024-10-01

## 2024-10-01 NOTE — TELEPHONE ENCOUNTER
Attempted to contact pt as a reminder of upcoming appt. Patient did not answer. Message was left.

## 2024-10-02 ENCOUNTER — OFFICE VISIT (OUTPATIENT)
Dept: PULMONOLOGY | Age: 19
End: 2024-10-02
Payer: COMMERCIAL

## 2024-10-02 VITALS
TEMPERATURE: 97 F | BODY MASS INDEX: 29.41 KG/M2 | SYSTOLIC BLOOD PRESSURE: 126 MMHG | DIASTOLIC BLOOD PRESSURE: 77 MMHG | HEIGHT: 66 IN | OXYGEN SATURATION: 98 % | RESPIRATION RATE: 15 BRPM | HEART RATE: 70 BPM | WEIGHT: 183 LBS

## 2024-10-02 DIAGNOSIS — J45.20 MILD INTERMITTENT ASTHMA WITHOUT COMPLICATION: Primary | ICD-10-CM

## 2024-10-02 LAB — FENO: 11 PPB

## 2024-10-02 PROCEDURE — 95012 NITRIC OXIDE EXP GAS DETER: CPT | Performed by: NURSE PRACTITIONER

## 2024-10-02 PROCEDURE — 99212 OFFICE O/P EST SF 10 MIN: CPT | Performed by: NURSE PRACTITIONER

## 2024-10-02 RX ORDER — FLUTICASONE PROPIONATE AND SALMETEROL XINAFOATE 230; 21 UG/1; UG/1
2 AEROSOL, METERED RESPIRATORY (INHALATION) 2 TIMES DAILY
Qty: 12 G | Refills: 15 | Status: SHIPPED | OUTPATIENT
Start: 2024-10-02

## 2024-10-02 RX ORDER — ALBUTEROL SULFATE 90 UG/1
2 INHALANT RESPIRATORY (INHALATION) EVERY 6 HOURS PRN
Qty: 18 G | Refills: 6 | Status: SHIPPED | OUTPATIENT
Start: 2024-10-02

## 2024-10-02 RX ORDER — IPRATROPIUM BROMIDE AND ALBUTEROL 20; 100 UG/1; UG/1
1 SPRAY, METERED RESPIRATORY (INHALATION) EVERY 6 HOURS PRN
Qty: 4 G | Refills: 5 | Status: SHIPPED | OUTPATIENT
Start: 2024-10-02

## 2024-10-02 ASSESSMENT — PULMONARY FUNCTION TESTS: FENO: 11

## 2024-10-02 NOTE — PROGRESS NOTES
[facility]  Department of Pulmonary, Critical Care and Sleep Medicine  Dr. Lemus, Dr. Doll, Dr. Nguyen, HUNTER Hi    Pulmonary & Critical Care Office Note - Follow up      Assessment/Plan     Problem List Items Addressed This Visit          Respiratory    Asthma - Primary    Relevant Orders    POCT Nitric Oxide (Completed)       Patient ID: Maria Victoria Wray is a 18 y.o. male    Chief Complaint: Establish care for management of asthma    HPI: Maria Victoria Wray is a 18 y.o. male with a PMH of asthma diagnosed as a young child. Had multiple hospitalizations related to his asthma before the age of 4. Maria Victoria recently moved to the [facility] from [place] about 7 months ago. He works as a  at the [employer]. He has 2 dogs at home, a dalmatian and a pit bull. He feels his asthma is controlled. He does not use Singulair as when he was previously on it he had suicidal ideations. At this time we will continue his current medication regimen of Advair, combivent and albuterol. He also uses Dupixent for eczema which is also helping to control his asthma symptoms. Last ER visit and steroid burst 1 year ago.     October 2, 2024: Maria Victoria is seen and examined today for 6 month evaluation of asthma. Since his last visit he reports his asthma symptoms have been controlled. He did have a torn right meniscus and underwent repair. He has not been as physically active since the surgery and notices it in his breathing. He is planning to slowly start working out again. 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 He continues to use Dupixent to control his eczema. At this time we will continue current therapy. RTC in 1 year or sooner if needed.     Assessment:    Asthma- stable with current medication regimen  Allergies  Eczema- controlled with Dupixent    Plan:     Continue Advair, combivent and zyrtec  Eosinophil 
6 mos follow up; no changes. Pt meds refilled and will follow up in 6 mos. Appt given.   
walking

## 2024-10-07 NOTE — PROGRESS NOTES
[facility]      Department of Family Medicine  Phone: [phone]   Fax: [phone]    10/7/24    Maria Victoria Wray is a 18 y.o. male with PMHx of mild intermittent asthma, eczema, allergic rhinitis, history of testicular torsion presenting to the outpatient clinic for:    Chief Complaint   Patient presents with    New Patient     No concerns        New patient  (Prev Enaa)    HPI:    Social:  Moved here in 2023 from [place], was in [place] prior until 9   Lives with: mother   Works at: [employer] current  Children/grandchildren: none  Pets: 2 dogs   Hobbies: Gym, wrestle  Alcohol: denies   Drugs: denies   Tobacco: denies   Vape: denies     Medical History:  Asthma: Follows with pulmonology (FAHAD Monreal), last seen 10/2;, on Advair twice daily, Combivent as needed, albuterol as needed  Allergic rhinitis: Flonase, Zyrtec  Eczema: Dupixent    Family History:  Denies colon cancer history   Grandfather has blood cancer (diagnosed last week, starts with M)   Grandmother has breast cancer     Health Maintenance:  Vaccines:   Colonoscopy: n/a  Labs: due, never done; could grab baseline   Lung CT: n/a    Allergies   Allergen Reactions    [place] (Diagnostic) Anaphylaxis, Hives and Itching    Aspirin Palpitations and Shortness Of Breath    [place] Nut (Berthollefia Excelsa) Anaphylaxis, Hives and Itching    Dust Mite Extract Itching    Other      Horses - anaphylaxis; cats, trees, grass, alfalfa - congestion, itchy watery eyes       Review of Systems:  Review of Systems   Constitutional:  Negative for chills, fatigue and fever.   HENT:  Negative for congestion, sinus pain and sore throat.    Eyes:  Negative for pain and visual disturbance.   Respiratory:  Negative for cough, shortness of breath and wheezing.    Cardiovascular:  Negative for chest pain, palpitations and leg swelling.   Gastrointestinal:  Negative for abdominal pain, nausea and vomiting.   Genitourinary:  Negative for

## 2024-10-09 ENCOUNTER — OFFICE VISIT (OUTPATIENT)
Dept: FAMILY MEDICINE CLINIC | Age: 19
End: 2024-10-09
Payer: COMMERCIAL

## 2024-10-09 VITALS
BODY MASS INDEX: 29.44 KG/M2 | DIASTOLIC BLOOD PRESSURE: 80 MMHG | HEART RATE: 89 BPM | OXYGEN SATURATION: 97 % | HEIGHT: 66 IN | WEIGHT: 183.2 LBS | SYSTOLIC BLOOD PRESSURE: 128 MMHG | TEMPERATURE: 97.3 F

## 2024-10-09 DIAGNOSIS — Z00.00 ANNUAL PHYSICAL EXAM: ICD-10-CM

## 2024-10-09 DIAGNOSIS — Z11.4 SCREENING FOR HIV WITHOUT PRESENCE OF RISK FACTORS: ICD-10-CM

## 2024-10-09 DIAGNOSIS — E78.5 HYPERLIPIDEMIA, UNSPECIFIED HYPERLIPIDEMIA TYPE: ICD-10-CM

## 2024-10-09 DIAGNOSIS — Z11.59 ENCOUNTER FOR HEPATITIS C SCREENING TEST FOR LOW RISK PATIENT: ICD-10-CM

## 2024-10-09 DIAGNOSIS — E55.9 VITAMIN D DEFICIENCY: ICD-10-CM

## 2024-10-09 DIAGNOSIS — Z00.129 ENCOUNTER FOR WELL ADOLESCENT VISIT WITHOUT ABNORMAL FINDINGS: Primary | ICD-10-CM

## 2024-10-09 PROCEDURE — 99395 PREV VISIT EST AGE 18-39: CPT | Performed by: STUDENT IN AN ORGANIZED HEALTH CARE EDUCATION/TRAINING PROGRAM

## 2024-10-09 SDOH — HEALTH STABILITY: PHYSICAL HEALTH: ON AVERAGE, HOW MANY DAYS PER WEEK DO YOU ENGAGE IN MODERATE TO STRENUOUS EXERCISE (LIKE A BRISK WALK)?: 7 DAYS

## 2024-10-09 SDOH — HEALTH STABILITY: PHYSICAL HEALTH: ON AVERAGE, HOW MANY MINUTES DO YOU ENGAGE IN EXERCISE AT THIS LEVEL?: 60 MIN

## 2024-10-09 ASSESSMENT — ENCOUNTER SYMPTOMS
EYE PAIN: 0
SINUS PAIN: 0
NAUSEA: 0
SORE THROAT: 0
ABDOMINAL PAIN: 0
VOMITING: 0
WHEEZING: 0
COUGH: 0
SHORTNESS OF BREATH: 0

## 2024-11-21 DIAGNOSIS — J30.89 NON-SEASONAL ALLERGIC RHINITIS, UNSPECIFIED TRIGGER: ICD-10-CM

## 2024-11-21 RX ORDER — CETIRIZINE HYDROCHLORIDE 10 MG/1
10 TABLET ORAL DAILY
Qty: 90 TABLET | Refills: 3 | Status: SHIPPED | OUTPATIENT
Start: 2024-11-21

## 2024-11-21 NOTE — TELEPHONE ENCOUNTER
Name of Medication(s) Requested:  Requested Prescriptions     Pending Prescriptions Disp Refills    cetirizine (ZYRTEC) 10 MG tablet 90 tablet 3     Sig: Take 1 tablet by mouth daily       Medication is on current medication list Yes    Dosage and directions were verified? Yes    Quantity verified: 90 day supply     Pharmacy Verified?  Yes    Last Appointment:  10/9/2024    Future appts:  Future Appointments   Date Time Provider Department Center   8/5/2025  9:30 AM Rachael Monrela, APRN - CNP [facility]   10/9/2025  7:30 AM Wellington Ron, DO [facility]        (If no appt send self scheduling link. .REFILLAPPT)  Scheduling request sent?     [] Yes  [] No    Does patient need updated?  [] Yes  [] No

## 2025-01-03 ENCOUNTER — OFFICE VISIT (OUTPATIENT)
Dept: FAMILY MEDICINE CLINIC | Age: 20
End: 2025-01-03
Payer: COMMERCIAL

## 2025-01-03 VITALS
BODY MASS INDEX: 30.08 KG/M2 | DIASTOLIC BLOOD PRESSURE: 80 MMHG | HEIGHT: 66 IN | SYSTOLIC BLOOD PRESSURE: 124 MMHG | TEMPERATURE: 98.6 F | HEART RATE: 73 BPM | OXYGEN SATURATION: 98 % | WEIGHT: 187.2 LBS

## 2025-01-03 DIAGNOSIS — U07.1 COVID-19: Primary | ICD-10-CM

## 2025-01-03 DIAGNOSIS — J45.901 EXACERBATION OF ASTHMA, UNSPECIFIED ASTHMA SEVERITY, UNSPECIFIED WHETHER PERSISTENT: ICD-10-CM

## 2025-01-03 DIAGNOSIS — J30.89 NON-SEASONAL ALLERGIC RHINITIS, UNSPECIFIED TRIGGER: ICD-10-CM

## 2025-01-03 PROCEDURE — 99214 OFFICE O/P EST MOD 30 MIN: CPT | Performed by: STUDENT IN AN ORGANIZED HEALTH CARE EDUCATION/TRAINING PROGRAM

## 2025-01-03 RX ORDER — GUAIFENESIN 600 MG/1
1200 TABLET, EXTENDED RELEASE ORAL 2 TIMES DAILY PRN
COMMUNITY
Start: 2025-01-03

## 2025-01-03 RX ORDER — PREDNISONE 10 MG/1
TABLET ORAL
Qty: 30 TABLET | Refills: 0 | Status: SHIPPED | OUTPATIENT
Start: 2025-01-03 | End: 2025-01-13

## 2025-01-03 RX ORDER — FLUTICASONE PROPIONATE 50 MCG
2 SPRAY, SUSPENSION (ML) NASAL DAILY
Qty: 16 G | Refills: 4 | Status: SHIPPED | OUTPATIENT
Start: 2025-01-03

## 2025-01-03 RX ORDER — BROMPHENIRAMINE MALEATE, PSEUDOEPHEDRINE HYDROCHLORIDE, AND DEXTROMETHORPHAN HYDROBROMIDE 2; 30; 10 MG/5ML; MG/5ML; MG/5ML
5 SYRUP ORAL 4 TIMES DAILY PRN
Qty: 473 ML | Refills: 0 | Status: SHIPPED | OUTPATIENT
Start: 2025-01-03

## 2025-01-03 RX ORDER — AZITHROMYCIN 250 MG/1
TABLET, FILM COATED ORAL
Qty: 6 TABLET | Refills: 0 | Status: SHIPPED | OUTPATIENT
Start: 2025-01-03 | End: 2025-01-13

## 2025-01-03 ASSESSMENT — PATIENT HEALTH QUESTIONNAIRE - PHQ9
1. LITTLE INTEREST OR PLEASURE IN DOING THINGS: NOT AT ALL
2. FEELING DOWN, DEPRESSED OR HOPELESS: NOT AT ALL
SUM OF ALL RESPONSES TO PHQ QUESTIONS 1-9: 0
SUM OF ALL RESPONSES TO PHQ QUESTIONS 1-9: 0
SUM OF ALL RESPONSES TO PHQ9 QUESTIONS 1 & 2: 0
SUM OF ALL RESPONSES TO PHQ QUESTIONS 1-9: 0
SUM OF ALL RESPONSES TO PHQ QUESTIONS 1-9: 0

## 2025-01-03 NOTE — PROGRESS NOTES
[facility]      Department of Family Medicine  Phone: [phone]   Fax: [phone]  01/03/25     Maria Victoria Wray is a 19 y.o. male with PMHx of mild intermittent asthma, eczema, allergic rhinitis, history of testicular torsion presenting to the outpatient clinic for:  Chief Complaint   Patient presents with    Cough     Had covid 2.5 x weeks ago, productive lingering cough      Accompanied by mom    HPI:    Acute Care Visit     COVID-19 with lingering cough   Symptom Onset: 2.5 weeks  Symptoms include: cough and mucus production  Denies: fever, nausea, vomiting, and diarrhea  Has tried: using rescue inhaler, cough drops, was taking cold and flu pills   History Mild Intermittent Asthma: Advair, combivent and zyrtec; Follows with pulmonology (FAHAD Monreal), last seen 10/2   Has had to be intubated multiple times (4 mo- 6/8 yo)   When little would have frequent exacerbations and hospitalizations      Allergies   Allergen Reactions    [place] (Diagnostic) Anaphylaxis, Hives and Itching    Aspirin Palpitations and Shortness Of Breath    [place] Nut (Berthollefia Excelsa) Anaphylaxis, Hives and Itching    Dust Mite Extract Itching    Other      Horses - anaphylaxis; cats, trees, grass, alfalfa - congestion, itchy watery eyes       Review of Systems:  Review of Systems    Physical Exam:  VS:  /80   Pulse 73   Temp 98.6 °F (37 °C) (Temporal)   Ht 1.676 m (5' 6\")   Wt 84.9 kg (187 lb 3.2 oz)   SpO2 98%   BMI 30.21 kg/m²     Physical Exam  Vitals and nursing note reviewed.   Constitutional:       General: He is not in acute distress.     Appearance: He is not ill-appearing.   Cardiovascular:      Rate and Rhythm: Normal rate and regular rhythm.      Heart sounds: Normal heart sounds. No murmur heard.     No gallop.   Pulmonary:      Effort: No respiratory distress.      Breath sounds: Wheezing (faint, in bilateral bases) present. No rhonchi.      Comments: Coughing during  no

## 2025-04-07 ENCOUNTER — OFFICE VISIT (OUTPATIENT)
Dept: FAMILY MEDICINE CLINIC | Age: 20
End: 2025-04-07
Payer: COMMERCIAL

## 2025-04-07 VITALS
TEMPERATURE: 97.4 F | RESPIRATION RATE: 19 BRPM | WEIGHT: 187 LBS | DIASTOLIC BLOOD PRESSURE: 78 MMHG | HEIGHT: 66 IN | SYSTOLIC BLOOD PRESSURE: 124 MMHG | BODY MASS INDEX: 30.05 KG/M2 | HEART RATE: 79 BPM | OXYGEN SATURATION: 99 %

## 2025-04-07 DIAGNOSIS — H65.93 BILATERAL OTITIS MEDIA WITH EFFUSION: Primary | ICD-10-CM

## 2025-04-07 DIAGNOSIS — H69.93 DYSFUNCTION OF BOTH EUSTACHIAN TUBES: ICD-10-CM

## 2025-04-07 DIAGNOSIS — E78.5 HYPERLIPIDEMIA, UNSPECIFIED HYPERLIPIDEMIA TYPE: ICD-10-CM

## 2025-04-07 DIAGNOSIS — Z11.59 ENCOUNTER FOR HEPATITIS C SCREENING TEST FOR LOW RISK PATIENT: ICD-10-CM

## 2025-04-07 DIAGNOSIS — Z00.129 ENCOUNTER FOR WELL ADOLESCENT VISIT WITHOUT ABNORMAL FINDINGS: ICD-10-CM

## 2025-04-07 DIAGNOSIS — Z11.4 SCREENING FOR HIV WITHOUT PRESENCE OF RISK FACTORS: ICD-10-CM

## 2025-04-07 DIAGNOSIS — E55.9 VITAMIN D DEFICIENCY: ICD-10-CM

## 2025-04-07 LAB
BASOPHILS ABSOLUTE: 0.03 K/UL (ref 0–0.2)
BASOPHILS RELATIVE PERCENT: 1 % (ref 0–2)
EOSINOPHILS ABSOLUTE: 0.09 K/UL (ref 0.05–0.5)
EOSINOPHILS RELATIVE PERCENT: 2 % (ref 0–6)
HCT VFR BLD CALC: 42.7 % (ref 37–54)
HEMOGLOBIN: 14.6 G/DL (ref 12.5–16.5)
IMMATURE GRANULOCYTES %: 0 % (ref 0–5)
IMMATURE GRANULOCYTES ABSOLUTE: <0.03 K/UL (ref 0–0.58)
LYMPHOCYTES ABSOLUTE: 1.92 K/UL (ref 1.5–4)
LYMPHOCYTES RELATIVE PERCENT: 40 % (ref 20–42)
MCH RBC QN AUTO: 28.3 PG (ref 26–35)
MCHC RBC AUTO-ENTMCNC: 34.2 G/DL (ref 32–34.5)
MCV RBC AUTO: 82.8 FL (ref 80–99.9)
MONOCYTES ABSOLUTE: 0.29 K/UL (ref 0.1–0.95)
MONOCYTES RELATIVE PERCENT: 6 % (ref 2–12)
NEUTROPHILS ABSOLUTE: 2.49 K/UL (ref 1.8–7.3)
NEUTROPHILS RELATIVE PERCENT: 52 % (ref 43–80)
PDW BLD-RTO: 13 % (ref 11.5–15)
PLATELET # BLD: 227 K/UL (ref 130–450)
PMV BLD AUTO: 9.3 FL (ref 7–12)
RBC # BLD: 5.16 M/UL (ref 3.8–5.8)
WBC # BLD: 4.8 K/UL (ref 4.5–11.5)

## 2025-04-07 PROCEDURE — 99213 OFFICE O/P EST LOW 20 MIN: CPT

## 2025-04-07 RX ORDER — PREDNISONE 10 MG/1
TABLET ORAL
Qty: 18 TABLET | Refills: 0 | Status: SHIPPED | OUTPATIENT
Start: 2025-04-07 | End: 2025-04-16

## 2025-04-07 SDOH — ECONOMIC STABILITY: FOOD INSECURITY: WITHIN THE PAST 12 MONTHS, YOU WORRIED THAT YOUR FOOD WOULD RUN OUT BEFORE YOU GOT MONEY TO BUY MORE.: NEVER TRUE

## 2025-04-07 SDOH — ECONOMIC STABILITY: FOOD INSECURITY: WITHIN THE PAST 12 MONTHS, THE FOOD YOU BOUGHT JUST DIDN'T LAST AND YOU DIDN'T HAVE MONEY TO GET MORE.: NEVER TRUE

## 2025-04-07 ASSESSMENT — ENCOUNTER SYMPTOMS
EYE DISCHARGE: 0
SHORTNESS OF BREATH: 0
ABDOMINAL PAIN: 0
SORE THROAT: 0
EYE REDNESS: 0
WHEEZING: 0
EYE PAIN: 0
DIARRHEA: 0
VOMITING: 0
SINUS PRESSURE: 0
COUGH: 0
NAUSEA: 0
BACK PAIN: 0
RHINORRHEA: 0

## 2025-04-07 NOTE — PROGRESS NOTES
Chief Complaint:   Dizziness (Getting with dizziness with movement of head, he does work in pool)      History of Present Illness   HPI:  Maria Victoria Wray is a 19 y.o. male who [facility] for [facility]iness that started about a week ago. Pt describes dizziness as a room spinning sensation with each episode lasting roughly 30 seconds and resolves spontaneously. Symptoms are exacerbated with movement and denies alleviating factors.     Prior to Visit Medications    Medication Sig Taking? Authorizing Provider   fluticasone (FLONASE) 50 MCG/ACT nasal spray 2 sprays by Nasal route daily  Wellington Ron, DO   brompheniramine-pseudoephedrine-DM 2-30-10 MG/5ML syrup Take 5 mLs by mouth 4 times daily as needed for Congestion or Cough  Wellington Ron, DO   guaiFENesin (MUCINEX) 600 MG extended release tablet Take 2 tablets by mouth 2 times daily as needed for Congestion  Wellington Ron,    cetirizine (ZYRTEC) 10 MG tablet Take 1 tablet by mouth daily  Wellington Ron,    ADVAIR -21 MCG/ACT inhaler Inhale 2 puffs into the lungs 2 times daily  Rachael Monreal APRN - CNP   albuterol sulfate HFA (PROVENTIL;VENTOLIN;PROAIR) 108 (90 Base) MCG/ACT inhaler Inhale 2 puffs into the lungs every 6 hours as needed for Wheezing or Shortness of Breath  Rachael Monreal APRN - CNP   COMBIVENT RESPIMAT  MCG/ACT AERS inhaler Inhale 1 puff into the lungs every 6 hours as needed for Wheezing or Shortness of Breath  Rachael Monreal APRN - CNP   DUPIXENT 300 MG/2ML SOPN injection Inject 2 mLs into the skin every 14 days  Provider, MD Brian       Review of Systems   Review of Systems   Constitutional:  Negative for activity change, chills, fatigue and fever.   HENT:  Negative for congestion, ear pain, rhinorrhea, sinus pressure and sore throat.    Eyes:  Negative for pain, discharge and redness.   Respiratory:  Negative for cough, shortness of breath and wheezing.

## 2025-04-08 ENCOUNTER — RESULTS FOLLOW-UP (OUTPATIENT)
Dept: FAMILY MEDICINE CLINIC | Age: 20
End: 2025-04-08

## 2025-04-08 LAB
ALBUMIN: 4.9 G/DL (ref 3.5–5.2)
ALP BLD-CCNC: 90 U/L (ref 40–129)
ALT SERPL-CCNC: 35 U/L (ref 0–40)
ANION GAP SERPL CALCULATED.3IONS-SCNC: 17 MMOL/L (ref 7–16)
AST SERPL-CCNC: 26 U/L (ref 0–39)
BILIRUB SERPL-MCNC: 0.4 MG/DL (ref 0–1.2)
BUN BLDV-MCNC: 22 MG/DL (ref 6–20)
CALCIUM SERPL-MCNC: 9.9 MG/DL (ref 8.6–10.2)
CHLORIDE BLD-SCNC: 103 MMOL/L (ref 98–107)
CHOLESTEROL, TOTAL: 173 MG/DL
CO2: 22 MMOL/L (ref 22–29)
CREAT SERPL-MCNC: 1 MG/DL (ref 0.7–1.2)
GFR, ESTIMATED: >90 ML/MIN/1.73M2
GLUCOSE BLD-MCNC: 88 MG/DL (ref 74–99)
HDLC SERPL-MCNC: 41 MG/DL
HEPATITIS C ANTIBODY: NONREACTIVE
HIV AG/AB: NONREACTIVE
LDL CHOLESTEROL: 113 MG/DL
POTASSIUM SERPL-SCNC: 4.4 MMOL/L (ref 3.5–5)
SODIUM BLD-SCNC: 142 MMOL/L (ref 132–146)
TOTAL PROTEIN: 7.3 G/DL (ref 6.4–8.3)
TRIGL SERPL-MCNC: 93 MG/DL
VITAMIN D 25-HYDROXY: 13.4 NG/ML (ref 30–100)
VLDLC SERPL CALC-MCNC: 19 MG/DL

## 2025-04-09 RX ORDER — ERGOCALCIFEROL 1.25 MG/1
50000 CAPSULE, LIQUID FILLED ORAL WEEKLY
Qty: 12 CAPSULE | Refills: 1 | Status: SHIPPED | OUTPATIENT
Start: 2025-04-09